# Patient Record
Sex: MALE | Race: WHITE | NOT HISPANIC OR LATINO | Employment: FULL TIME | URBAN - METROPOLITAN AREA
[De-identification: names, ages, dates, MRNs, and addresses within clinical notes are randomized per-mention and may not be internally consistent; named-entity substitution may affect disease eponyms.]

---

## 2021-12-02 LAB
ESTIMATED AVERAGE GLUCOSE: 126 MG/DL
FOLATE: 14.1 NG/ML
HBA1C MFR BLD: 6 %
TSH SERPL DL<=0.05 MIU/L-ACNC: 2 MIU/L (ref 0.44–3.9)
VITAMIN B-12: 451 PG/ML (ref 211–911)
VITAMIN D 25-HYDROXY: 24 NG/ML

## 2021-12-03 LAB — ANA WITH REFLEX TO ENA: NEGATIVE

## 2021-12-06 LAB
ALBUMIN SERPL-MCNC: 4.5 G/DL (ref 3.4–5)
ALPHA 1: 0.3 G/DL (ref 0.2–0.6)
ALPHA 2: 0.7 G/DL (ref 0.4–1.1)
BETA: 0.8 G/DL (ref 0.5–1.2)
GAMMA: 0.9 G/DL (ref 0.5–1.4)
INTERPRETATION: NORMAL
PATHOLOGIST REVIEW: NORMAL
TOTAL PROTEIN: 7.1 G/DL (ref 6.4–8.2)

## 2023-11-27 ENCOUNTER — OFFICE VISIT (OUTPATIENT)
Dept: NEUROSURGERY | Facility: CLINIC | Age: 68
End: 2023-11-27
Payer: COMMERCIAL

## 2023-11-27 VITALS
HEIGHT: 75 IN | SYSTOLIC BLOOD PRESSURE: 118 MMHG | DIASTOLIC BLOOD PRESSURE: 68 MMHG | BODY MASS INDEX: 25.49 KG/M2 | WEIGHT: 205 LBS | HEART RATE: 112 BPM | TEMPERATURE: 97.5 F

## 2023-11-27 DIAGNOSIS — M54.16 LUMBAR RADICULOPATHY, ACUTE: Primary | ICD-10-CM

## 2023-11-27 PROCEDURE — 1159F MED LIST DOCD IN RCRD: CPT | Performed by: STUDENT IN AN ORGANIZED HEALTH CARE EDUCATION/TRAINING PROGRAM

## 2023-11-27 PROCEDURE — 1036F TOBACCO NON-USER: CPT | Performed by: STUDENT IN AN ORGANIZED HEALTH CARE EDUCATION/TRAINING PROGRAM

## 2023-11-27 PROCEDURE — 99203 OFFICE O/P NEW LOW 30 MIN: CPT | Performed by: STUDENT IN AN ORGANIZED HEALTH CARE EDUCATION/TRAINING PROGRAM

## 2023-11-27 PROCEDURE — 1125F AMNT PAIN NOTED PAIN PRSNT: CPT | Performed by: STUDENT IN AN ORGANIZED HEALTH CARE EDUCATION/TRAINING PROGRAM

## 2023-11-27 RX ORDER — LISINOPRIL AND HYDROCHLOROTHIAZIDE 20; 25 MG/1; MG/1
1 TABLET ORAL
COMMUNITY
Start: 2023-09-29

## 2023-11-27 ASSESSMENT — PATIENT HEALTH QUESTIONNAIRE - PHQ9
1. LITTLE INTEREST OR PLEASURE IN DOING THINGS: NOT AT ALL
SUM OF ALL RESPONSES TO PHQ9 QUESTIONS 1 AND 2: 0
2. FEELING DOWN, DEPRESSED OR HOPELESS: NOT AT ALL

## 2023-11-27 ASSESSMENT — PAIN SCALES - GENERAL: PAINLEVEL: 2

## 2023-11-27 NOTE — PROGRESS NOTES
[facility] [place]  Department of Neurological Surgery  New Patient Visit    History of Present Illness:  Chuck Mercer is a 67 y.o. year old male who presents to the spine clinic with a remote history of left sided leg pain, now resolved on its own. He had a heavy lifting incident on 9/2023 and developed acute onset right side radiculopathy, mainly on the medial thigh and knee. He has been attempting conservative care at home doing self guided physical therapy. Today, he is doing well. At this point, his severe pain has become mild and intermittent. He is fully ambulatory. He has seen significant improvement in his symptoms over the last 2 months.    Prior Spine Surgeries: None    Physical Therapy: Yes, at home  Diabetic: No   Osteoporosis: No  Patient's BMI is Body mass index is 25.62 kg/m².    Review of Systems:  14/14 systems reviewed and negative other than what is listed in the history of present illness    There is no problem list on file for this patient.    No past medical history on file.  No past surgical history on file.  Social History     Tobacco Use    Smoking status: Never    Smokeless tobacco: Never   Substance Use Topics    Alcohol use: Not on file     Comment: rarely     family history is not on file.    Current Outpatient Medications:     lisinopriL-hydrochlorothiazide 20-25 mg tablet, Take 1 tablet by mouth once daily in the morning. Take before meals., Disp: , Rfl:   Allergies   Allergen Reactions    Penicillins Hives       Physical Examination    General: Well developed, awake/alert/oriented x3, no distress, alert and cooperative  Skin: Warm and dry, no lesions, no rashes  ENMT: Mucous membranes moist, no apparent injury, no lesions seen  Head/Neck: Neck Supple, no apparent injury  Respiratory/Thorax: Normal breath sounds with good chest expansion, thorax symmetric  Cardiovascular: No pitting edema, no JVD    Motor Strength: 5/5 Throughout all extremities    Muscle Bulk:  Normal and symmetric in all extremities    Posture:   -- Cervical: Normal  -- Thoracic: Normal  -- Lumbar : Normal  Paraspinal muscle spasm/tenderness absent.     Sensation: intact to light touch    Results    I personally reviewed and interpreted the imaging results which included MRI showing right L3-4 lateral disc herniation and L2-3 central disc herniation and multi-level degenerative disc disease.    Assessment and Plan:    Chuck Mercer is a 67 y.o. year old male who presents to the spine clinic with left sided leg pain, now resolved. He has been attempting at home physical therapy which seems to be helping to improve his symptoms over the last 2 months. At this time, it does not appear that further intervention is needed. He will follow up as needed and call if his symptoms recur.     I have reviewed all prior documentation and reviewed the electronic medical record since admission. I have personally have reviewed all advanced imaging not just the reports and used my interpretation as documented as the relevant findings. I have reviewed the risks and benefits of all treatment recommendations listed in this note with the patient and family. I spent a total of 35 minutes in service to this patient's care during this date of service.    The above clinical summary has been dictated with voice recognition software. It has not been proofread for grammatical errors, typographical mistakes, or other semantic inconsistencies.    Thank you for visiting our office today. It was our pleasure to take part in your healthcare.     Do not hesitate to call with any questions regarding your plan of care after leaving at [phone] M-F 8am-4pm.     To clinicians, thank you very much for this kind referral. It is a privilege to partner with you in the care of your patients. My office would be delighted to assist you with any further consultations or with questions regarding the plan of care outlined. Do not hesitate to  call the office or contact me directly.       Sincerely,      Darrion Mccoy MD  Director, [facility]   of Neurosurgery, [facility] and [facility]  Complex Spine Fellowship Director  , Department of Neurological Surgery  [employer]    [facility]  [address]  [address]  [place] [address]    [facility]  [address]  [address]  [place] [address]    Phone: [phone]  Fax: [phone]        Scribe Attestation  By signing my name below, I, Milady Durham , Scribe   attest that this documentation has been prepared under the direction and in the presence of Darrion Mccoy MD.